# Patient Record
Sex: MALE | Race: WHITE | NOT HISPANIC OR LATINO | Employment: OTHER | URBAN - METROPOLITAN AREA
[De-identification: names, ages, dates, MRNs, and addresses within clinical notes are randomized per-mention and may not be internally consistent; named-entity substitution may affect disease eponyms.]

---

## 2017-04-26 ENCOUNTER — OFFICE VISIT (OUTPATIENT)
Dept: NEUROSURGERY | Facility: CLINIC | Age: 67
End: 2017-04-26

## 2017-04-26 VITALS
BODY MASS INDEX: 25.2 KG/M2 | DIASTOLIC BLOOD PRESSURE: 78 MMHG | HEART RATE: 64 BPM | WEIGHT: 180 LBS | HEIGHT: 71 IN | SYSTOLIC BLOOD PRESSURE: 133 MMHG

## 2017-04-26 DIAGNOSIS — G91.2 NORMAL PRESSURE HYDROCEPHALUS (HCC): Primary | ICD-10-CM

## 2017-04-26 DIAGNOSIS — Z45.41 ENCOUNTER FOR MANAGEMENT OF CEREBROSPINAL FLUID DRAINAGE DEVICE: ICD-10-CM

## 2017-04-26 PROCEDURE — 99212 OFFICE O/P EST SF 10 MIN: CPT | Performed by: NEUROLOGICAL SURGERY

## 2017-04-26 NOTE — PROGRESS NOTES
Subjective   Patient ID: Sandro De Anda is a 66 y.o. male is here today for follow-up after right frontal programmable [employer]  shunt on 8/25/15.    At the patient's last visit, his shunt was functional at the 1.5 mmH2O shunt setting.  He was encouraged to f/u in one year.    Today, the patient notes that sometimes he will experience a burning sensation in his right temple.  He also notes intermittent lightheadedness.      The patient denies any headaches, dizziness, seizures, fainting or black out spells.  The patient denies any issues with his gait.  He notes history of urinary incontinence, but states that he follows with urology for this.    History of Present Illness    The following portions of the patient's history were reviewed and updated as appropriate: allergies, current medications, past family history, past medical history, past social history, past surgical history and problem list.    I shunted this patient almost 2 years ago for normal pressure hydrocephalus.  He comes in for his annual visit.  The shunt is still set at 1.5.  It pumps and refills well.  His gait is actually quite normal to me today.  We'll see him again in one year.  He will let us know if there are any symptoms that are recurrent wart as she has had an MRI.      Review of Systems   Constitutional: Negative for fever.   Cardiovascular: Negative for chest pain.   Neurological: Negative for seizures.   All other systems reviewed and are negative.      Objective   Physical Exam   Constitutional: He is oriented to person, place, and time. He appears well-developed and well-nourished.   HENT:   Head: Normocephalic and atraumatic.   Eyes: Conjunctivae and EOM are normal. Pupils are equal, round, and reactive to light.   Fundoscopic exam:       The right eye shows no papilledema. The right eye shows venous pulsations.        The left eye shows no papilledema. The left eye shows venous pulsations.   Neck: Carotid bruit is not present.    Neurological: He is oriented to person, place, and time. He has a normal Finger-Nose-Finger Test and a normal Heel to Shin Test. Gait normal.   Reflex Scores:       Tricep reflexes are 2+ on the right side and 2+ on the left side.       Bicep reflexes are 2+ on the right side and 2+ on the left side.       Brachioradialis reflexes are 2+ on the right side and 2+ on the left side.       Patellar reflexes are 2+ on the right side and 2+ on the left side.       Achilles reflexes are 2+ on the right side and 2+ on the left side.  Psychiatric: His speech is normal.     Neurologic Exam     Mental Status   Oriented to person, place, and time.   Registration of memory: Good recent and remote memory.   Attention: normal. Concentration: normal.   Speech: speech is normal   Level of consciousness: alert  Knowledge: consistent with education.     Cranial Nerves     CN II   Visual fields full to confrontation.   Visual acuity: normal    CN III, IV, VI   Pupils are equal, round, and reactive to light.  Extraocular motions are normal.     CN V   Facial sensation intact.   Right corneal reflex: normal  Left corneal reflex: normal    CN VII   Facial expression full, symmetric.   Right facial weakness: none  Left facial weakness: none    CN VIII   Hearing: intact    CN IX, X   Palate: symmetric    CN XI   Right sternocleidomastoid strength: normal  Left sternocleidomastoid strength: normal    CN XII   Tongue: not atrophic  Tongue deviation: none    Motor Exam   Muscle bulk: normal  Right arm tone: normal  Left arm tone: normal  Right leg tone: normal  Left leg tone: normal    Strength   Strength 5/5 except as noted.     Sensory Exam   Light touch normal.     Gait, Coordination, and Reflexes     Gait  Gait: normal    Coordination   Finger to nose coordination: normal  Heel to shin coordination: normal    Reflexes   Right brachioradialis: 2+  Left brachioradialis: 2+  Right biceps: 2+  Left biceps: 2+  Right triceps: 2+  Left  triceps: 2+  Right patellar: 2+  Left patellar: 2+  Right achilles: 2+  Left achilles: 2+  Right : 2+  Left : 2+      Assessment/Plan   Independent Review of Radiographic Studies:      Medical Decision Making:    Doing well.  Almost 2 years out from surgery.  Shunt is set at 1.5.  It is functional.  We'll see him again in one year.      Sandro was seen today for normal pressure hydrocephalus.    Diagnoses and all orders for this visit:    Normal pressure hydrocephalus    Encounter for management of cerebrospinal fluid drainage device    Return in about 1 year (around 4/26/2018).

## 2018-04-24 NOTE — PROGRESS NOTES
Subjective   Patient ID: Sandro De Anda is a 67 y.o. male is here today for follow-up for a shunt check.    At the last visit the shunt was functional at the 1.5 setting. He reported a burning sensation in the right temple along with intermittent lightheadedness. He denied having any headaches, dizziness, seizures, fainting or black out spells and issues with his gait.    Today the patient reports that he is still having the burning sensation and lightheadedness. He also complains of dizziness. He denies having any headaches, seizures, fainting or black out spells and issues with his gait.    History of Present Illness    The following portions of the patient's history were reviewed and updated as appropriate: allergies, current medications, past family history, past medical history, past social history, past surgical history and problem list.    Review of Systems   Musculoskeletal: Negative for gait problem.   Neurological: Positive for dizziness and light-headedness. Negative for seizures.   All other systems reviewed and are negative.    He is with his wife. It has been almost 3 years since a  shunt was placed on the right for normal-pressure hydrocephalus. He is doing quite well. His gait is stable. The shunt pumps and refills well. It is still set at 1.5. He has some tenderness in the right temple which is not related to the shunt but might be early symptomatology related to temporal arteritis. I told him to talk to his primary care physician whom he will be seeing soon. Will see him in another year.      Objective   Physical Exam   Constitutional: He is oriented to person, place, and time. He appears well-developed and well-nourished.   HENT:   Head: Normocephalic and atraumatic.   Eyes: Conjunctivae and EOM are normal. Pupils are equal, round, and reactive to light.   Fundoscopic exam:       The right eye shows no papilledema. The right eye shows venous pulsations.        The left eye shows no papilledema. The  left eye shows venous pulsations.   Neck: Carotid bruit is not present.   Neurological: He is oriented to person, place, and time. He has a normal Finger-Nose-Finger Test and a normal Heel to Shin Test. Gait normal.   Reflex Scores:       Tricep reflexes are 2+ on the right side and 2+ on the left side.       Bicep reflexes are 2+ on the right side and 2+ on the left side.       Brachioradialis reflexes are 2+ on the right side and 2+ on the left side.       Patellar reflexes are 2+ on the right side and 2+ on the left side.       Achilles reflexes are 2+ on the right side and 2+ on the left side.  Psychiatric: His speech is normal.     Neurologic Exam     Mental Status   Oriented to person, place, and time.   Registration of memory: Good recent and remote memory.   Attention: normal. Concentration: normal.   Speech: speech is normal   Level of consciousness: alert  Knowledge: consistent with education.     Cranial Nerves     CN II   Visual fields full to confrontation.   Visual acuity: normal    CN III, IV, VI   Pupils are equal, round, and reactive to light.  Extraocular motions are normal.     CN V   Facial sensation intact.   Right corneal reflex: normal  Left corneal reflex: normal    CN VII   Facial expression full, symmetric.   Right facial weakness: none  Left facial weakness: none    CN VIII   Hearing: intact    CN IX, X   Palate: symmetric    CN XI   Right sternocleidomastoid strength: normal  Left sternocleidomastoid strength: normal    CN XII   Tongue: not atrophic  Tongue deviation: none    Motor Exam   Muscle bulk: normal  Right arm tone: normal  Left arm tone: normal  Right leg tone: normal  Left leg tone: normal    Strength   Strength 5/5 except as noted.     Sensory Exam   Light touch normal.     Gait, Coordination, and Reflexes     Gait  Gait: normal    Coordination   Finger to nose coordination: normal  Heel to shin coordination: normal    Reflexes   Right brachioradialis: 2+  Left  brachioradialis: 2+  Right biceps: 2+  Left biceps: 2+  Right triceps: 2+  Left triceps: 2+  Right patellar: 2+  Left patellar: 2+  Right achilles: 2+  Left achilles: 2+  Right : 2+  Left : 2+      Assessment/Plan   Independent Review of Radiographic Studies:      Medical Decision Making:    Almost 3 years out from a  shunt for NPH.  The shunt is functional and still set at 1.5.  He'll see me again in one year.      Diagnoses and all orders for this visit:    Normal pressure hydrocephalus    Encounter for management of cerebrospinal fluid drainage device      Return in about 1 year (around 4/25/2019).

## 2018-04-25 ENCOUNTER — OFFICE VISIT (OUTPATIENT)
Dept: NEUROSURGERY | Facility: CLINIC | Age: 68
End: 2018-04-25

## 2018-04-25 VITALS
DIASTOLIC BLOOD PRESSURE: 78 MMHG | SYSTOLIC BLOOD PRESSURE: 143 MMHG | HEART RATE: 61 BPM | BODY MASS INDEX: 26.04 KG/M2 | WEIGHT: 186 LBS | HEIGHT: 71 IN

## 2018-04-25 DIAGNOSIS — Z45.41 ENCOUNTER FOR MANAGEMENT OF CEREBROSPINAL FLUID DRAINAGE DEVICE: ICD-10-CM

## 2018-04-25 DIAGNOSIS — G91.2 NORMAL PRESSURE HYDROCEPHALUS (HCC): Primary | ICD-10-CM

## 2018-04-25 PROCEDURE — 99212 OFFICE O/P EST SF 10 MIN: CPT | Performed by: NEUROLOGICAL SURGERY

## 2018-04-25 RX ORDER — FINASTERIDE 5 MG/1
TABLET, FILM COATED ORAL
COMMUNITY
Start: 2018-04-10 | End: 2022-02-01

## 2019-01-25 ENCOUNTER — OFFICE VISIT CONVERTED (OUTPATIENT)
Dept: UROLOGY | Facility: CLINIC | Age: 69
End: 2019-01-25
Attending: UROLOGY

## 2019-02-11 ENCOUNTER — HOSPITAL ENCOUNTER (OUTPATIENT)
Dept: LAB | Facility: HOSPITAL | Age: 69
Discharge: HOME | End: 2019-02-11
Attending: UROLOGY

## 2019-02-11 LAB — PSA SERPL-MCNC: 0.19 NG/ML (ref 0–4)

## 2019-05-21 NOTE — PROGRESS NOTES
Subjective   Patient ID: Sandro De Anda is a 68 y.o. male is here today for yearly follow-up for NPH.     Patient was last seen 4.25.18 and his shunt was set at 1.5.  Patient was having dizziness at that time.    Patient is 4 years out from  shunt placement for NPH.    Patient states that he is still having constant dizziness, it is not severe.  Patient denies headaches, no double or blurry vision, he is due for an eye exam, he denies syncope or seizure.  He denies problems with his balance and gait.  No cognitive changes or concerns.    He see's Pee Tilley MD urologist in [facility] for urinary incontinence issues.  He has an upcoming appt to see him soon.    It has been about 4 years since I put in a  shunt for normal-pressure hydrocephalus. It was his gait that was bothering him the most, and his gait is strikingly normal now. He did have some urinary incontinence and some dementia and that is improved ever so slightly. He still has difficulty remembering. The shunt pumps and refills well. It is still set at 1.5. He has some prostate problems and he has seen the urologist for that. Will see him again in 1 year, sooner if there is a concern or a problem.      Dizziness   The problem occurs intermittently. The problem has been unchanged. Associated symptoms include urinary symptoms. Pertinent negatives include no headaches, numbness, vertigo, visual change or weakness.       The following portions of the patient's history were reviewed and updated as appropriate: allergies, current medications, past family history, past medical history, past social history, past surgical history and problem list.    Review of Systems   Eyes: Negative for visual disturbance.   Genitourinary: Positive for difficulty urinating.   Musculoskeletal: Negative for gait problem.   Neurological: Positive for dizziness. Negative for vertigo, tremors, seizures, syncope, facial asymmetry, speech difficulty, weakness, light-headedness,  numbness and headaches.   All other systems reviewed and are negative.      Objective   Physical Exam   Constitutional: He is oriented to person, place, and time. He appears well-developed and well-nourished.   HENT:   Head: Normocephalic and atraumatic.   Eyes: Conjunctivae and EOM are normal. Pupils are equal, round, and reactive to light.   Fundoscopic exam:       The right eye shows no papilledema. The right eye shows venous pulsations.        The left eye shows no papilledema. The left eye shows venous pulsations.   Neck: Carotid bruit is not present.   Neurological: He is oriented to person, place, and time. He has a normal Finger-Nose-Finger Test and a normal Heel to Shin Test. Gait normal.   Reflex Scores:       Tricep reflexes are 2+ on the right side and 2+ on the left side.       Bicep reflexes are 2+ on the right side and 2+ on the left side.       Brachioradialis reflexes are 2+ on the right side and 2+ on the left side.       Patellar reflexes are 2+ on the right side and 2+ on the left side.       Achilles reflexes are 2+ on the right side and 2+ on the left side.  Psychiatric: His speech is normal.     Neurologic Exam     Mental Status   Oriented to person, place, and time.   Registration of memory: Good recent and remote memory.   Attention: normal. Concentration: normal.   Speech: speech is normal   Level of consciousness: alert  Knowledge: consistent with education.     Cranial Nerves     CN II   Visual fields full to confrontation.   Visual acuity: normal    CN III, IV, VI   Pupils are equal, round, and reactive to light.  Extraocular motions are normal.     CN V   Facial sensation intact.   Right corneal reflex: normal  Left corneal reflex: normal    CN VII   Facial expression full, symmetric.   Right facial weakness: none  Left facial weakness: none    CN VIII   Hearing: intact    CN IX, X   Palate: symmetric    CN XI   Right sternocleidomastoid strength: normal  Left sternocleidomastoid  strength: normal    CN XII   Tongue: not atrophic  Tongue deviation: none    Motor Exam   Muscle bulk: normal  Right arm tone: normal  Left arm tone: normal  Right leg tone: normal  Left leg tone: normal    Strength   Strength 5/5 except as noted.     Sensory Exam   Light touch normal.     Gait, Coordination, and Reflexes     Gait  Gait: normal    Coordination   Finger to nose coordination: normal  Heel to shin coordination: normal    Reflexes   Right brachioradialis: 2+  Left brachioradialis: 2+  Right biceps: 2+  Left biceps: 2+  Right triceps: 2+  Left triceps: 2+  Right patellar: 2+  Left patellar: 2+  Right achilles: 2+  Left achilles: 2+  Right : 2+  Left : 2+      Assessment/Plan   Independent Review of Radiographic Studies:    I reviewed the postoperative CT scan done on 8/26/2015 which shows good position of the shunt within the enlarged ventricles.  Agree with the report.      Medical Decision Making:    The shunt is functional.  It is set at 1.5.  He will see me again in 1 year.      Sandro was seen today for dizziness.    Diagnoses and all orders for this visit:    Normal pressure hydrocephalus    Encounter for management of cerebrospinal fluid drainage device      Return in about 1 year (around 5/31/2020).

## 2019-05-31 ENCOUNTER — OFFICE VISIT (OUTPATIENT)
Dept: NEUROSURGERY | Facility: CLINIC | Age: 69
End: 2019-05-31

## 2019-05-31 VITALS
WEIGHT: 190 LBS | DIASTOLIC BLOOD PRESSURE: 78 MMHG | BODY MASS INDEX: 26.6 KG/M2 | SYSTOLIC BLOOD PRESSURE: 140 MMHG | HEIGHT: 71 IN | HEART RATE: 72 BPM

## 2019-05-31 DIAGNOSIS — Z45.41 ENCOUNTER FOR MANAGEMENT OF CEREBROSPINAL FLUID DRAINAGE DEVICE: ICD-10-CM

## 2019-05-31 DIAGNOSIS — G91.2 NORMAL PRESSURE HYDROCEPHALUS (HCC): Primary | ICD-10-CM

## 2019-05-31 PROCEDURE — 99212 OFFICE O/P EST SF 10 MIN: CPT | Performed by: NEUROLOGICAL SURGERY

## 2020-02-21 ENCOUNTER — HOSPITAL ENCOUNTER (OUTPATIENT)
Dept: LAB | Facility: HOSPITAL | Age: 70
Discharge: HOME | End: 2020-02-21
Attending: UROLOGY

## 2020-02-21 ENCOUNTER — OFFICE VISIT CONVERTED (OUTPATIENT)
Dept: UROLOGY | Facility: CLINIC | Age: 70
End: 2020-02-21
Attending: UROLOGY

## 2020-02-21 LAB — PSA SERPL-MCNC: 0.13 NG/ML (ref 0–4)

## 2020-03-06 ENCOUNTER — HOSPITAL ENCOUNTER (OUTPATIENT)
Dept: SURGERY | Facility: CLINIC | Age: 70
Discharge: HOME | End: 2020-03-06
Attending: UROLOGY

## 2020-03-06 ENCOUNTER — PROCEDURE VISIT CONVERTED (OUTPATIENT)
Dept: UROLOGY | Facility: CLINIC | Age: 70
End: 2020-03-06
Attending: UROLOGY

## 2020-03-08 LAB — BACTERIA UR CULT: NORMAL

## 2020-05-14 NOTE — PROGRESS NOTES
Subjective   History of Present Illness: Sandro De Anda is a 69 y.o. male for televisit follow up.  He has a  shunt that was set at 1.5 at his last visit    He was last seen 5.31.19 for mild intermittent dizziness    He states that he is doing well, he denies headaches, dizziness, problems with his balance and gait, he denies double or blurry vision, he states that he has not had an eye exam in 5 years, no cognitive changes or concerns.  No urinary incontinence.  He states that his shunt appears to be working well      You have chosen to receive care through a telephone visit. Do you consent to use a telephone visit for your medical care today? Yes, he is at home    This was a televisit from my office. The patient was at home. It lasted for 7 minutes. It has been 5 years since I placed a shunt for normal pressure hydrocephalus, presenting mainly with gait ataxia. His gait stabilized. He did not really note a dramatic improvement but he certainly has not gotten worse. It has been a year since I have seen him. He reports no changes and no falling. He still has bladder problems, but probably related to his prostate and he works with his urologist for that. I told him it is still important for me to see him face to face and will arrange another visit in 1 year. His shunt at least check was at 1.5.      History of Present Illness    The following portions of the patient's history were reviewed and updated as appropriate: allergies, current medications, past family history, past medical history, past social history, past surgical history and problem list.    Review of Systems   Constitutional: Negative.    Eyes: Negative.  Negative for visual disturbance.   Respiratory: Negative.    Cardiovascular: Negative.    Gastrointestinal: Negative.    Endocrine: Negative.    Genitourinary: Negative for urgency.   Musculoskeletal: Negative.    Allergic/Immunologic: Negative.    Neurological: Negative for dizziness, tremors, seizures,  syncope, facial asymmetry, speech difficulty, weakness, light-headedness and headaches.   Hematological: Negative.    Psychiatric/Behavioral: Negative for confusion and decreased concentration.       Objective             Physical Exam   Deferred  Neurologic Exam   Deferred        Assessment/Plan   Independent Review of Radiographic Studies:      I personally reviewed the images from the following studies.    I reviewed the postoperative CT scan done on 8/26/2015 which shows good position of the shunt within the enlarged ventricles.  Agree with the report.    Medical Decision Making:      Overall, appears to be doing well.  His shunt is still set at 1.5.  He will see me as a face-to-face visit in 1 year.      Sandro was seen today for follow-up.    Diagnoses and all orders for this visit:    Normal pressure hydrocephalus (CMS/[facility])    Encounter for management of cerebrospinal fluid drainage device      Return in about 1 year (around 5/19/2021).

## 2020-05-18 ENCOUNTER — PROCEDURE VISIT CONVERTED (OUTPATIENT)
Dept: UROLOGY | Facility: CLINIC | Age: 70
End: 2020-05-18
Attending: UROLOGY

## 2020-05-19 ENCOUNTER — OFFICE VISIT (OUTPATIENT)
Dept: NEUROSURGERY | Facility: CLINIC | Age: 70
End: 2020-05-19

## 2020-05-19 DIAGNOSIS — G91.2 NORMAL PRESSURE HYDROCEPHALUS (HCC): Primary | ICD-10-CM

## 2020-05-19 DIAGNOSIS — Z45.41 ENCOUNTER FOR MANAGEMENT OF CEREBROSPINAL FLUID DRAINAGE DEVICE: ICD-10-CM

## 2020-05-19 PROCEDURE — 99441 PR PHYS/QHP TELEPHONE EVALUATION 5-10 MIN: CPT | Performed by: NEUROLOGICAL SURGERY

## 2020-06-19 ENCOUNTER — TELEPHONE CONVERTED (OUTPATIENT)
Dept: UROLOGY | Facility: CLINIC | Age: 70
End: 2020-06-19
Attending: UROLOGY

## 2020-06-29 ENCOUNTER — HOSPITAL ENCOUNTER (OUTPATIENT)
Dept: SURGERY | Facility: CLINIC | Age: 70
Discharge: HOME | End: 2020-06-29
Attending: UROLOGY

## 2020-07-01 LAB — BACTERIA UR CULT: NORMAL

## 2020-09-18 ENCOUNTER — OFFICE VISIT CONVERTED (OUTPATIENT)
Dept: UROLOGY | Facility: CLINIC | Age: 70
End: 2020-09-18
Attending: UROLOGY

## 2020-12-15 ENCOUNTER — OFFICE VISIT CONVERTED (OUTPATIENT)
Dept: UROLOGY | Facility: CLINIC | Age: 70
End: 2020-12-15
Attending: UROLOGY

## 2021-05-10 NOTE — PROCEDURES
Procedure Note      Patient Name: Sandro De Anda   Patient ID: [id number]   Sex: Male   YOB: 1950    Primary Care Provider: Patsy MAS   Referring Provider: Pee Tilley MD    Visit Date: May 18, 2020    Provider: Pee Tilley MD   Location[facility]   Location Address: [address] [place] [address]  [id number]   Location Phone: [phone]          Sandro De Anda presents to the office today to undergo a Rezum procedure. The patient understands the material risks, possible benefits, and alternatives for symptomatic Benign Prostate Hypertrophy. The patient understands that BLADDER FUNCTION is independent of this de-obstructing procedure. Improved voiding results may take 2 to 4 weeks before being noticed depending on prostate size and number of treatments. Bleeding, infection and irritative voiding are the most common side effects. Patients will continue to use their current BPH meds until advised to taper or stop. The patient was given the opportunity to have their questions answered in a previous face to face meeting and also today. Informed consent signed by a patient.   Patient medications and allergies have been reviewed prior to treatment. Patients on blood thinners or aspirin (ASA) have been advised to check with the prescribing MD if it is okay to hold these prior to treatment. Risks of withholding blood thinners and or ASA have been discussed.   Prior to proceeding, transrectal ultrasound obtained prostate volume and dimensions.   Prostate: 25 g HT: 2.5 mm W: 4.1 mm L: 4.5 mm   Tammy-prostatic block anesthesia was then performed using trans-rectal ultrasound guidance and a long 18-gauge spinal needle. INPUT cc's of 1% lidocaine was injected bilaterally. Minimal blood loss.   Description of Procedure:   Transurethral destruction of prostate tissue; by radiofrequency thermotherapy. In the lithotomy position, under local anesthesia, the patient was prepped  and draped in the usual manner. The anterior urethra, prostate, bladder neck, right and left orifices were visualized as the scope was passed. RF was then used to create thermal energy to selectively ablate prostate tissue 1 cm from the bladder neck to the proximal end of the veru spaced 1 cm apart.   2 Treatments were given. Treatments: Rt. Side 0-4 1, Lt. Side 0-4 1. Median lobe 0-3 0. Blood loss was minimal. The patient was monitored throughout the procedure.   After the completion of the procedure the treatment device was removed. There was a careful check that there were no clots in the bladder or injury to the bladder, prostate, or urethra. Flanagan catheter was then placed.           Assessment  · Benign prostatic hyperplasia with lower urinary tract symptoms     600.01/N40.1      Plan  · Orders  o Transurethral destruction of prostate tissue; by radiofrequency-generated water vapor thermotherapy (23358) - 600.01/N40.1 - 05/18/2020  · Medications  o Medications have been Reconciled  o Transition of Care or Provider Policy  · Instructions  o Urine was negative for nitrite, WBC, and RBC's.   o The patient was observed, appeared stable and in good condition at the time of discharge. Post-procedure expectations and activity limitations were reviewed with him and written instructions were provided. The patient is going home with a Flanagan catheter, therefore catheter care and PRN irrigation instructions were reviewed and written post-op instructions were provided. He is scheduled to return to the office for its removal. He was also instructed to continue mild oral analgesia PRN. The patient was then transported by our attending staff member to his car by wheelchair. His care was given unto a consenting adult who is responsible for his transportation home.            Electronically Signed by: Pee Tilley MD -Author on May 18, 2020 01:46:16 PM

## 2021-05-13 NOTE — PROGRESS NOTES
Progress Note      Patient Name: Sandro De Anda   Patient ID: [id number]   Sex: Male   YOB: 1950    Primary Care Provider: Patsy MAS   Referring Provider: Pee Tilley MD    Visit Date: September 18, 2020    Provider: Pee Tilley MD   Location: [facility]   Location Address: [address] [place] [address]  [id number]   Location Phone: [phone]          Chief Complaint  · pt here for urologic issues      History Of Present Illness     69-year-old  gentleman who is coming in today for BPH with lower urinary tract symptoms. s/p Rezum    Had a lot of trouble after Rezum he was having a lot of urgency/frequency.  Some minimal incontinence  No gross hematuria, he does have some dysuria. He does think things are a little better in the last 2 weeks.  Weak stream some of the time.    Currently on Flomax 0.4 mg daily. Not sure if this is helping.    5/18/2020  Rezum     was on Flomax and finasteride in the past.      PVR    6/20   75  5/18/2020  Rezum  1/19  80    2-3 caffeinated beverages daily.    Previous    3/20 cystoscopy2 cm prostate, large bladder with some very shallow diverticulum's and some mild trabeculations.    No history of kidney stone.    Brother with prostate cancer    No cardiopulmonary history.  Patient does not smoke.  Baby ASA     Getting prostate cancer screening through PCP.    PSA    2/19  0.19       Past Medical History  Hemorrhoids; Sciatic pain; Seasonal allergies; Urinary Retention         Past Surgical History  Shunt placement; Vasectomy         Medication List  aspirin 81 mg oral tablet,delayed release (DR/EC); Daily Multi-Vitamin oral tablet; Dulcolax (bisacodyl) 5 mg oral tablet,delayed release (DR/EC); finasteride 5 mg oral tablet; ibuprofen 200 mg oral tablet; tamsulosin 0.4 mg oral capsule         Allergy List  NO KNOWN DRUG ALLERGIES         Family Medical History  Stroke         Social History  Alcohol; ;  "Tobacco (Former)         Immunizations  Name Date Admin   Influenza    Influenza    Influenza          Review of Systems  · Constitutional  o Denies  o : chills, fever  · Gastrointestinal  o Denies  o : nausea, vomiting      Vitals  Date Time BP Position Site L\R Cuff Size HR RR TEMP (F) WT  HT  BMI kg/m2 BSA m2 O2 Sat        09/18/2020 11:37 AM       17  187lbs 0oz 5'  11\" 26.08 2.06           Physical Examination  · Constitutional  o Appearance  o : Well-appearing, well-developed, in no acute distress  · Respiratory  o Respiratory Effort  o : Unlabored breathing  · Gastrointestinal  o Abdominal Examination  o : Nontender, nondistended, no rigidity or guarding, no hepatosplenomegaly  · Neurologic  o Mental Status Examination  o :   § Orientation  § : Grossly oriented to person, place and time, judgment and insight intact, normal mood and affect          Results  · In-Office Procedures  o Surgical procedure  § IOP - Bladder Scan/Residual Urine (36254)   § Specimen vol Ur: 075   o Lab procedure  § Automated Dipstick Urinalysis (Surg Spec) WITHOUT Micro HMH (36997)   § Color Ur: Yellow   § Clarity Ur: Clear   § Glucose Ur Ql Strip: Negative   § Bilirub Ur Ql Strip: Negative   § Ketones Ur Ql Strip: Negative   § Sp Gr Ur Qn: 1.010   § Hgb Ur Ql Strip: Negative   § pH Ur-LsCnc: 7.0   § Prot Ur Ql Strip: Negative   § Urobilinogen Ur Strip-mCnc: 0.2 E.U./dL   § Nitrite Ur Ql Strip: Negative   § WBC Est Ur Ql Strip: Negative       Assessment  · Benign prostatic hyperplasia with weak urinary stream       Benign prostatic hyperplasia with lower urinary tract symptoms     600.01/N40.1  Poor urinary stream     600.01/R39.12  · Erectile dysfunction, unspecified erectile dysfunction type     607.84/N52.9  · Prostate cancer screening     V76.44/Z12.5    Problems Reconciled  Plan  · Medications  o Medications have been Reconciled  o Transition of Care or Provider Policy  · Instructions  o Electronically Identified Patient " Education Materials Provided Electronically       Patient is not doing much better.  He did think maybe things improved in the last 2 weeks.  At this time we will stay on Flomax 0.4 mg daily.  I did discuss if he gets better to stop this medication.    At this time we will see how things go, if he is not better in a few months we can discuss a new plan either back to medical management or possible TURP    Follow-up in 3 months with [facility]    in person visit.             Electronically Signed by: Pee Tilley MD -Author on September 18, 2020 03:52:13 PM

## 2021-05-14 VITALS — HEIGHT: 71 IN | RESPIRATION RATE: 17 BRPM | WEIGHT: 192.37 LBS | BODY MASS INDEX: 26.93 KG/M2

## 2021-05-14 VITALS — WEIGHT: 187 LBS | HEIGHT: 71 IN | BODY MASS INDEX: 26.18 KG/M2 | RESPIRATION RATE: 17 BRPM

## 2021-05-14 NOTE — PROGRESS NOTES
"   Progress Note      Patient Name: Sandro De Anda   Patient ID: [id number]   Sex: Male   YOB: 1950    Primary Care Provider: Patsy MAS   Referring Provider: Pee Tilley MD    Visit Date: December 15, 2020    Provider: Pee Tilley MD   Location: [facility]   Location Address: [address] [place] [address]  [id number]   Location Phone: [phone]          Chief Complaint  · pt here for urologic issues      History Of Present Illness     70-year-old  gentleman who is coming in today for BPH with lower urinary tract symptoms. s/p Rezum 5/20    Stream is good most of time.  Patient of burning/dysuria.  Patient stopped Flomax a month ago.  No change after stopping this medication    PVR    12/20 21  6/20   75  5/18/2020  Rezum  1/19  80    Previous    3/20 cystoscopy - 2 cm prostate, large bladder with some very shallow diverticulum's and some mild trabeculations.    No history of kidney stone.    Brother with prostate cancer    No cardiopulmonary history.  Patient does not smoke.  Baby ASA     Getting prostate cancer screening through PCP.    PSA    2/19  0.19       Past Medical History  Hemorrhoids; Sciatic pain; Seasonal allergies; Urinary retention         Past Surgical History  Shunt placement; Vasectomy         Allergy List  NO KNOWN DRUG ALLERGIES         Family Medical History  Stroke         Social History  Alcohol; ; Tobacco (Former)         Immunizations  Name Date Admin   Influenza 11/13/2019   Influenza 11/13/2019   Influenza 11/08/2018         Review of Systems  · Constitutional  o Denies  o : chills, fever  · Gastrointestinal  o Denies  o : nausea, vomiting      Vitals  Date Time BP Position Site L\R Cuff Size HR RR TEMP (F) WT  HT  BMI kg/m2 BSA m2 O2 Sat FR L/min FiO2        12/15/2020 12:53 PM       17  192lbs 6oz 5'  11\" 26.83 2.09             Physical Examination  · Constitutional  o Appearance  o : Well-appearing, " well-developed, in no acute distress  · Respiratory  o Respiratory Effort  o : Unlabored breathing  · Cardiovascular  o Heart  o :   § Auscultation of Heart  § : Regular rate and rhythm, no murmurs  · Gastrointestinal  o Abdominal Examination  o : Nontender, nondistended, no rigidity or guarding, no hepatosplenomegaly  · Neurologic  o Mental Status Examination  o :   § Orientation  § : Grossly oriented to person, place and time, judgment and insight intact, normal mood and affect          Results  · In-Office Procedures  o Lab procedure  § Automated Dipstick Urinalysis (Surg Spec) WITHOUT Micro HMH (01047)   § Color Ur: Yellow   § Clarity Ur: Clear   § Glucose Ur Ql Strip: Negative   § Bilirub Ur Ql Strip: Negative   § Ketones Ur Ql Strip: Negative   § Sp Gr Ur Qn: 1.025   § Hgb Ur Ql Strip: Negative   § pH Ur-LsCnc: 7.0   § Prot Ur Ql Strip: Negative   § Urobilinogen Ur Strip-mCnc: 1.0 E.U./dL   § Nitrite Ur Ql Strip: Negative   § WBC Est Ur Ql Strip: Negative   o Surgical procedure  § IOP - Bladder Scan/Residual Urine (24527)   § Specimen vol Ur: 021       Assessment  · Benign prostatic hyperplasia with weak urinary stream       Benign prostatic hyperplasia with lower urinary tract symptoms     600.01/N40.1  Poor urinary stream     600.01/R39.12  · Erectile dysfunction, unspecified erectile dysfunction type     607.84/N52.9  · Prostate cancer screening     V76.44/Z12.5      Plan  · Medications  o Medications have been Reconciled  o Transition of Care or Provider Policy  · Instructions  o Electronically Identified Patient Education Materials Provided Electronically       BPH    Better, no prostate meds needed at this time      If doing ok next year could possibly be discharged from clinic.    Follow-up in 1 year with nurse practitioner with [facility]                 Electronically Signed by: Pee Tilley MD -Author on December 15, 2020 01:36:22 PM

## 2021-05-15 VITALS — RESPIRATION RATE: 16 BRPM | WEIGHT: 190 LBS | BODY MASS INDEX: 26.6 KG/M2 | HEIGHT: 71 IN

## 2021-05-15 VITALS — HEIGHT: 71 IN | RESPIRATION RATE: 12 BRPM | BODY MASS INDEX: 27.21 KG/M2 | WEIGHT: 194.37 LBS

## 2021-05-18 NOTE — PROGRESS NOTES
"Subjective   Patient ID: Sandro De Anda is a 70 y.o. male is here today for follow-up. Patient was last seen   5/19/20 for Normal pressure hydrocephalus.      Patient reports today for a shunt check .  He complains of fatigue.    I shunted this patient for normal pressure hydrocephalus 6 years ago.  He is here for his yearly check.  His walking is about the same.  Is been stabilized since the shunt.  Pumps and refills well.  Still set at 1.5.  It is functional.  I will see him in 1 year.    History of Present Illness    The following portions of the patient's history were reviewed and updated as appropriate: allergies, current medications, past family history, past medical history, past social history, past surgical history and problem list.    Review of Systems   Constitutional: Negative for fever.   All other systems reviewed and are negative.          Objective     Vitals:    05/19/21 1452   BP: 126/86   Temp: 98.6 °F (37 °C)   Weight: 86.5 kg (190 lb 9.6 oz)   Height: 180.3 cm (71\")     Body mass index is 26.58 kg/m².      Physical Exam  Constitutional:       Appearance: He is well-developed.   HENT:      Head: Normocephalic and atraumatic.   Eyes:      Extraocular Movements: EOM normal.      Conjunctiva/sclera: Conjunctivae normal.      Pupils: Pupils are equal, round, and reactive to light.   Neck:      Vascular: No carotid bruit.   Neurological:      Mental Status: He is oriented to person, place, and time.      Coordination: Finger-Nose-Finger Test and Heel to Shin Test normal.      Gait: Gait is intact.      Deep Tendon Reflexes:      Reflex Scores:       Tricep reflexes are 2+ on the right side and 2+ on the left side.       Bicep reflexes are 2+ on the right side and 2+ on the left side.       Brachioradialis reflexes are 2+ on the right side and 2+ on the left side.       Patellar reflexes are 2+ on the right side and 2+ on the left side.       Achilles reflexes are 2+ on the right side and 2+ on the left " side.  Psychiatric:         Speech: Speech normal.       Neurologic Exam     Mental Status   Oriented to person, place, and time.   Registration of memory: Good recent and remote memory.   Attention: normal. Concentration: normal.   Speech: speech is normal   Level of consciousness: alert  Knowledge: consistent with education.     Cranial Nerves     CN II   Visual fields full to confrontation.   Visual acuity: normal    CN III, IV, VI   Pupils are equal, round, and reactive to light.  Extraocular motions are normal.     CN V   Facial sensation intact.   Right corneal reflex: normal  Left corneal reflex: normal    CN VII   Facial expression full, symmetric.   Right facial weakness: none  Left facial weakness: none    CN VIII   Hearing: intact    CN IX, X   Palate: symmetric    CN XI   Right sternocleidomastoid strength: normal  Left sternocleidomastoid strength: normal    CN XII   Tongue: not atrophic  Tongue deviation: none    Motor Exam   Muscle bulk: normal  Right arm tone: normal  Left arm tone: normal  Right leg tone: normal  Left leg tone: normal    Strength   Strength 5/5 except as noted.     Sensory Exam   Light touch normal.     Gait, Coordination, and Reflexes     Gait  Gait: normal    Coordination   Finger to nose coordination: normal  Heel to shin coordination: normal    Reflexes   Right brachioradialis: 2+  Left brachioradialis: 2+  Right biceps: 2+  Left biceps: 2+  Right triceps: 2+  Left triceps: 2+  Right patellar: 2+  Left patellar: 2+  Right achilles: 2+  Left achilles: 2+  Right : 2+  Left : 2+          Assessment/Plan   Independent Review of Radiographic Studies:      I personally reviewed the images from the following studies.    I reviewed the postoperative CT scan done on 8/26/2015 which shows good position of the shunt within the enlarged ventricles.  Agree with the report    Medical Decision Making:      Clinically stable.  Shunt is functional and set at 1.5.  I will see him in 1  year.      Diagnoses and all orders for this visit:    1. Normal pressure hydrocephalus (CMS/HCC) (Primary)    2. Encounter for management of cerebrospinal fluid drainage device      Return in about 1 year (around 5/19/2022) for Face-to-face.

## 2021-05-19 ENCOUNTER — OFFICE VISIT (OUTPATIENT)
Dept: NEUROSURGERY | Facility: CLINIC | Age: 71
End: 2021-05-19

## 2021-05-19 VITALS
HEIGHT: 71 IN | WEIGHT: 190.6 LBS | TEMPERATURE: 98.6 F | BODY MASS INDEX: 26.68 KG/M2 | DIASTOLIC BLOOD PRESSURE: 86 MMHG | SYSTOLIC BLOOD PRESSURE: 126 MMHG

## 2021-05-19 DIAGNOSIS — Z45.41 ENCOUNTER FOR MANAGEMENT OF CEREBROSPINAL FLUID DRAINAGE DEVICE: ICD-10-CM

## 2021-05-19 DIAGNOSIS — G91.2 NORMAL PRESSURE HYDROCEPHALUS (HCC): Primary | ICD-10-CM

## 2021-05-19 PROCEDURE — 99213 OFFICE O/P EST LOW 20 MIN: CPT | Performed by: NEUROLOGICAL SURGERY

## 2022-01-26 ENCOUNTER — TELEPHONE (OUTPATIENT)
Dept: NEUROSURGERY | Facility: CLINIC | Age: 72
End: 2022-01-26

## 2022-02-01 ENCOUNTER — OFFICE VISIT (OUTPATIENT)
Dept: UROLOGY | Facility: CLINIC | Age: 72
End: 2022-02-01

## 2022-02-01 VITALS — WEIGHT: 197.2 LBS | RESPIRATION RATE: 16 BRPM | BODY MASS INDEX: 27.61 KG/M2 | HEIGHT: 71 IN

## 2022-02-01 DIAGNOSIS — N40.0 BENIGN PROSTATIC HYPERPLASIA, UNSPECIFIED WHETHER LOWER URINARY TRACT SYMPTOMS PRESENT: Primary | ICD-10-CM

## 2022-02-01 PROBLEM — K64.9 HEMORRHOIDS: Status: ACTIVE | Noted: 2022-02-01

## 2022-02-01 PROBLEM — J30.2 SEASONAL ALLERGIC RHINITIS: Status: ACTIVE | Noted: 2022-02-01

## 2022-02-01 PROBLEM — R33.9 URINARY RETENTION: Status: ACTIVE | Noted: 2022-02-01

## 2022-02-01 PROBLEM — M50.30 DDD (DEGENERATIVE DISC DISEASE), CERVICAL: Status: ACTIVE | Noted: 2022-02-01

## 2022-02-01 PROBLEM — R26.0 ATAXIC GAIT: Status: ACTIVE | Noted: 2022-02-01

## 2022-02-01 PROBLEM — M54.30 SCIATIC PAIN: Status: ACTIVE | Noted: 2022-02-01

## 2022-02-01 PROBLEM — F03.C0 ADVANCED DEMENTIA (HCC): Status: ACTIVE | Noted: 2022-02-01

## 2022-02-01 LAB
BILIRUB BLD-MCNC: NEGATIVE MG/DL
CLARITY, POC: CLEAR
COLOR UR: YELLOW
EXPIRATION DATE: NORMAL
GLUCOSE UR STRIP-MCNC: NEGATIVE MG/DL
KETONES UR QL: NEGATIVE
LEUKOCYTE EST, POC: NEGATIVE
Lab: NORMAL
NITRITE UR-MCNC: NEGATIVE MG/ML
PH UR: 5.5 [PH] (ref 5–8)
PROT UR STRIP-MCNC: NEGATIVE MG/DL
RBC # UR STRIP: NEGATIVE /UL
SP GR UR: 1.01 (ref 1–1.03)
URINE VOLUME: 41
UROBILINOGEN UR QL: NORMAL

## 2022-02-01 PROCEDURE — 81003 URINALYSIS AUTO W/O SCOPE: CPT | Performed by: NURSE PRACTITIONER

## 2022-02-01 PROCEDURE — 51798 US URINE CAPACITY MEASURE: CPT | Performed by: NURSE PRACTITIONER

## 2022-02-01 PROCEDURE — 99212 OFFICE O/P EST SF 10 MIN: CPT | Performed by: NURSE PRACTITIONER

## 2022-02-01 RX ORDER — MULTIPLE VITAMINS W/ MINERALS TAB 9MG-400MCG
TAB ORAL
COMMUNITY
End: 2022-05-25

## 2022-02-01 RX ORDER — IBUPROFEN 200 MG
TABLET ORAL
COMMUNITY

## 2022-02-01 NOTE — PROGRESS NOTES
"Chief Complaint: Benign Prostatic Hypertrophy (NO ISSUES)    Subjective         History of Present Illness  Sandro De Anda is a 71 y.o. male presents to [facility] to be seen for annual follow-up BPH.    Patient is status post Rezum 5/20.    Good stream.    No medications.    Nocturia x 1     Good stream during the day but nighttime void and am not good.    Some post void dribble at times.     No GH         Objective     Past Medical History:   Diagnosis Date   • DDD (degenerative disc disease), cervical    • Dementia (HCC)    • NPH (normal pressure hydrocephalus) (HCC)        Past Surgical History:   Procedure Laterality Date   • NO PAST SURGERIES           Current Outpatient Medications:   •  aspirin 81 MG tablet, Take  by mouth., Disp: , Rfl:   •  ibuprofen (ADVIL,MOTRIN) 200 MG tablet, ibuprofen 200 mg oral tablet take 1 tablet (200 mg) by oral route every 6 hours as needed with food   Suspended, Disp: , Rfl:   •  Multiple Vitamin (MULTIVITAMINS PO), Take  by mouth., Disp: , Rfl:   •  multivitamin with minerals (DAILY MULTI VITAMIN/MINERALS PO), Daily Multi-Vitamin oral tablet take 1 tablet by oral route daily   Suspended, Disp: , Rfl:     No Known Allergies     Family History   Problem Relation Age of Onset   • Stroke Other    • Heart failure Other    • Hypertension Other    • Aneurysm Other        Social History     Socioeconomic History   • Marital status:    • Years of education: high school graduate   Tobacco Use   • Smoking status: Never Smoker   • Smokeless tobacco: Never Used   Vaping Use   • Vaping Use: Never used   Substance and Sexual Activity   • Alcohol use: No   • Drug use: No   • Sexual activity: Defer       Vital Signs:   Resp 16   Ht 180.3 cm (71\")   Wt 89.4 kg (197 lb 3.2 oz)   BMI 27.50 kg/m²      Physical Exam     Result Review :   The following data was reviewed by: CHACE Martin on 02/01/2022:  Results for orders placed or performed in visit on " 02/01/22   Bladder Scan   Result Value Ref Range    Urine Volume 41    POC Urinalysis Dipstick, Automated    Specimen: Urine   Result Value Ref Range    Color Yellow Yellow, Straw, Dark Yellow, Sahra    Clarity, UA Clear Clear    Specific Gravity  1.010 1.005 - 1.030    pH, Urine 5.5 5.0 - 8.0    Leukocytes Negative Negative    Nitrite, UA Negative Negative    Protein, POC Negative Negative mg/dL    Glucose, UA Negative Negative, 1000 mg/dL (3+) mg/dL    Ketones, UA Negative Negative    Urobilinogen, UA Normal Normal    Bilirubin Negative Negative    Blood, UA Negative Negative    Lot Number [id number]     Expiration Date 2,022/12         Bladder Scan interpretation 02/01/2022    Estimation of residual urine via [employer] 3000 [address] Bladder Scan  Residual Urine: 41 ml  Indication: Benign prostatic hyperplasia, unspecified whether lower urinary tract symptoms present   Position: Supine  Examination: Incremental scanning of the suprapubic area using 2.0 MHz transducer using copious amounts of acoustic gel.   Findings: An anechoic area was demonstrated which represented the bladder, with measurement of residual urine as noted. I inspected this myself. In that the residual urine was insignificant, refer to plan for treatment and plan necessary at this time.         Procedures        Assessment and Plan    Diagnoses and all orders for this visit:    1. Benign prostatic hyperplasia, unspecified whether lower urinary tract symptoms present (Primary)  -     Bladder Scan  -     POC Urinalysis Dipstick, Automated    Patient doing well at this time. We will f/u annually and PRN.       I spent 10 minutes caring for Sandro on this date of service. This time includes time spent by me in the following activities:reviewing tests, obtaining and/or reviewing a separately obtained history, performing a medically appropriate examination and/or evaluation , counseling and educating the patient/family/caregiver, ordering medications, tests, or  procedures, and documenting information in the medical record  Follow Up   No follow-ups on file.  Patient was given instructions and counseling regarding his condition or for health maintenance advice. Please see specific information pulled into the AVS if appropriate.         This document has been electronically signed by CHACE Martin  February 1, 2022 10:22 EST

## 2022-05-13 NOTE — PROGRESS NOTES
Subjective   Patient ID: Sandro De Anda is a 71 y.o. male is here today for follow-up of NPH.  Pt last seen on 5/19/21 and reported no issues. Shunt set at 1.5 at last visit.    Today, Mr. De Anda reports dizziness that occurs every day. He denies any recent falls but has stumbled. He denies headaches and visual problems.     This patient is with his wife.  Its been 7 years since I put in a right frontal programmable [employer] strata  shunt set at 1.5 for normal pressure hydrocephalus.  It did seem to help his gait slightly.  He is always had some residual balance symptoms.  He and his wife say that he feels he has gotten worse over the last year.  The shunt pumps and refills well.  It is still set at 1.5.  We have never tried a shunt change setting it is reasonable to do so now and so I changed it to 1.0.  I told him any change would take some time to have an effect if it is going to have any so I urged him to be patient.  We will see him in 3 months with a repeat CT scan to make sure he does not develop subdural hygromas or hematomas.        History of Present Illness    The following portions of the patient's history were reviewed and updated as appropriate: allergies, current medications, past family history, past medical history, past social history, past surgical history and problem list.    Review of Systems   Constitutional: Negative for fever.   Eyes: Negative for visual disturbance.   Respiratory: Positive for shortness of breath. Negative for chest tightness.    Cardiovascular: Negative for chest pain.   Neurological: Positive for dizziness. Negative for headaches.   All other systems reviewed and are negative.      Objective   Physical Exam  Constitutional:       Appearance: He is well-developed.   HENT:      Head: Normocephalic and atraumatic.   Eyes:      Extraocular Movements: EOM normal.      Conjunctiva/sclera: Conjunctivae normal.      Pupils: Pupils are equal, round, and reactive to light.   Neck:       Vascular: No carotid bruit.   Neurological:      Mental Status: He is oriented to person, place, and time.      Coordination: Finger-Nose-Finger Test and Heel to Shin Test normal.      Gait: Gait is intact.      Deep Tendon Reflexes:      Reflex Scores:       Tricep reflexes are 2+ on the right side and 2+ on the left side.       Bicep reflexes are 2+ on the right side and 2+ on the left side.       Brachioradialis reflexes are 2+ on the right side and 2+ on the left side.       Patellar reflexes are 2+ on the right side and 2+ on the left side.       Achilles reflexes are 2+ on the right side and 2+ on the left side.  Psychiatric:         Speech: Speech normal.       Neurologic Exam     Mental Status   Oriented to person, place, and time.   Registration of memory: Good recent and remote memory.   Attention: normal. Concentration: normal.   Speech: speech is normal   Level of consciousness: alert  Knowledge: consistent with education.     Cranial Nerves     CN II   Visual fields full to confrontation.   Visual acuity: normal    CN III, IV, VI   Pupils are equal, round, and reactive to light.  Extraocular motions are normal.     CN V   Facial sensation intact.   Right corneal reflex: normal  Left corneal reflex: normal    CN VII   Facial expression full, symmetric.   Right facial weakness: none  Left facial weakness: none    CN VIII   Hearing: intact    CN IX, X   Palate: symmetric    CN XI   Right sternocleidomastoid strength: normal  Left sternocleidomastoid strength: normal    CN XII   Tongue: not atrophic  Tongue deviation: none    Motor Exam   Muscle bulk: normal  Right arm tone: normal  Left arm tone: normal  Right leg tone: normal  Left leg tone: normal    Strength   Strength 5/5 except as noted.     Sensory Exam   Light touch normal.     Gait, Coordination, and Reflexes     Gait  Gait: normal    Coordination   Finger to nose coordination: normal  Heel to shin coordination: normal    Reflexes   Right  brachioradialis: 2+  Left brachioradialis: 2+  Right biceps: 2+  Left biceps: 2+  Right triceps: 2+  Left triceps: 2+  Right patellar: 2+  Left patellar: 2+  Right achilles: 2+  Left achilles: 2+  Right : 2+  Left : 2+      Assessment & Plan   Independent Review of Radiographic Studies:      I reviewed the postoperative CT scan done on 8/26/2015 which shows good position of the shunt within the enlarged ventricles.  Agree with the report    Medical Decision Making:      I reprogrammed him from 1.5-1.0.  We will get a CT scan in 3 months just before the next visit.  It can be done in [place].      Diagnoses and all orders for this visit:    1. Normal pressure hydrocephalus (HCC) (Primary)  -     CT Head Without Contrast; Future    2. Encounter for management of cerebrospinal fluid drainage device  -     CT Head Without Contrast; Future      Return in about 3 months (around 8/25/2022) for Face-to-face.

## 2022-05-25 ENCOUNTER — OFFICE VISIT (OUTPATIENT)
Dept: NEUROSURGERY | Facility: CLINIC | Age: 72
End: 2022-05-25

## 2022-05-25 VITALS
DIASTOLIC BLOOD PRESSURE: 68 MMHG | OXYGEN SATURATION: 98 % | WEIGHT: 197 LBS | SYSTOLIC BLOOD PRESSURE: 132 MMHG | BODY MASS INDEX: 27.58 KG/M2 | HEIGHT: 71 IN | HEART RATE: 60 BPM | TEMPERATURE: 97.1 F

## 2022-05-25 DIAGNOSIS — Z45.41 ENCOUNTER FOR MANAGEMENT OF CEREBROSPINAL FLUID DRAINAGE DEVICE: ICD-10-CM

## 2022-05-25 DIAGNOSIS — G91.2 NORMAL PRESSURE HYDROCEPHALUS: Primary | ICD-10-CM

## 2022-05-25 PROCEDURE — 62252 CSF SHUNT REPROGRAM: CPT | Performed by: NEUROLOGICAL SURGERY

## 2022-05-25 PROCEDURE — 99213 OFFICE O/P EST LOW 20 MIN: CPT | Performed by: NEUROLOGICAL SURGERY

## 2022-05-25 RX ORDER — LISINOPRIL 20 MG/1
TABLET ORAL
COMMUNITY
Start: 2022-05-02

## 2022-08-17 ENCOUNTER — HOSPITAL ENCOUNTER (OUTPATIENT)
Dept: CT IMAGING | Facility: HOSPITAL | Age: 72
Discharge: HOME | End: 2022-08-17
Admitting: NEUROLOGICAL SURGERY

## 2022-08-17 DIAGNOSIS — Z45.41 ENCOUNTER FOR MANAGEMENT OF CEREBROSPINAL FLUID DRAINAGE DEVICE: ICD-10-CM

## 2022-08-17 DIAGNOSIS — G91.2 NORMAL PRESSURE HYDROCEPHALUS: ICD-10-CM

## 2022-08-17 PROCEDURE — 70450 CT HEAD/BRAIN W/O DYE: CPT

## 2022-08-25 NOTE — PROGRESS NOTES
Subjective   Patient ID: Sandro De Anda is a 71 y.o. male is here today for follow-up of NPH after head CT. Shunt set at 1.0 at last visit.     Mr. De Anda reports he is doing well and has noticed some gradual improvements. He has not had any dizziness, headaches, lightheadedness, nausea or vomiting. His vision is not disturbed, and he does not have balance problems.     He is with his wife.  He had his right frontal  shunt placed 7 years ago in 2015 for normal pressure hydrocephalus.  We changed the shunt setting to 1.0 from 1.5 last time.  It is confirmed to be at that same setting.  It pumps and refills well.  He thinks his gait is a little bit better and his wife agrees.  We will keep it there.  The CT scan showed no complicating factors such as subdural collections.  We will see him in 9 months.  If he stable, on a yearly basis after that.        History of Present Illness    The following portions of the patient's history were reviewed and updated as appropriate: allergies, current medications, past family history, past medical history, past social history, past surgical history and problem list.    Review of Systems   Constitutional: Negative for fever.   Eyes: Negative for visual disturbance.   Gastrointestinal: Negative for nausea and vomiting.   Musculoskeletal: Negative for gait problem.   Neurological: Negative for dizziness, light-headedness and headaches.   All other systems reviewed and are negative.      Objective   Physical Exam  Constitutional:       Appearance: He is well-developed.   HENT:      Head: Normocephalic and atraumatic.   Eyes:      Extraocular Movements: EOM normal.      Conjunctiva/sclera: Conjunctivae normal.      Pupils: Pupils are equal, round, and reactive to light.   Neck:      Vascular: No carotid bruit.   Neurological:      Mental Status: He is oriented to person, place, and time.      Coordination: Finger-Nose-Finger Test and Heel to Shin Test normal.      Gait: Gait is intact.       Deep Tendon Reflexes:      Reflex Scores:       Tricep reflexes are 2+ on the right side and 2+ on the left side.       Bicep reflexes are 2+ on the right side and 2+ on the left side.       Brachioradialis reflexes are 2+ on the right side and 2+ on the left side.       Patellar reflexes are 2+ on the right side and 2+ on the left side.       Achilles reflexes are 2+ on the right side and 2+ on the left side.  Psychiatric:         Speech: Speech normal.       Neurologic Exam     Mental Status   Oriented to person, place, and time.   Registration of memory: Good recent and remote memory.   Attention: normal. Concentration: normal.   Speech: speech is normal   Level of consciousness: alert  Knowledge: consistent with education.     Cranial Nerves     CN II   Visual fields full to confrontation.   Visual acuity: normal    CN III, IV, VI   Pupils are equal, round, and reactive to light.  Extraocular motions are normal.     CN V   Facial sensation intact.   Right corneal reflex: normal  Left corneal reflex: normal    CN VII   Facial expression full, symmetric.   Right facial weakness: none  Left facial weakness: none    CN VIII   Hearing: intact    CN IX, X   Palate: symmetric    CN XI   Right sternocleidomastoid strength: normal  Left sternocleidomastoid strength: normal    CN XII   Tongue: not atrophic  Tongue deviation: none    Motor Exam   Muscle bulk: normal  Right arm tone: normal  Left arm tone: normal  Right leg tone: normal  Left leg tone: normal    Strength   Strength 5/5 except as noted.     Sensory Exam   Light touch normal.     Gait, Coordination, and Reflexes     Gait  Gait: normal    Coordination   Finger to nose coordination: normal  Heel to shin coordination: normal    Reflexes   Right brachioradialis: 2+  Left brachioradialis: 2+  Right biceps: 2+  Left biceps: 2+  Right triceps: 2+  Left triceps: 2+  Right patellar: 2+  Left patellar: 2+  Right achilles: 2+  Left achilles: 2+  Right :  2+  Left : 2+      Assessment & Plan   Independent Review of Radiographic Studies:      The CT scan of the brain done on 8/17/2022 showed stable position of the ventricular catheter roughly the same configuration of the ventricle as compared to 2015.  Agree with the report.      Medical Decision Making:      Shunt currently set at 1.0.  It is functional.  It pumps and refills well.  He seems to be clinically better.  We will see him in 9 months.      Diagnoses and all orders for this visit:    1. Normal pressure hydrocephalus (HCC) (Primary)    2. Encounter for management of cerebrospinal fluid drainage device      Return in about 9 months (around 5/31/2023) for Face-to-face.

## 2022-08-31 ENCOUNTER — OFFICE VISIT (OUTPATIENT)
Dept: NEUROSURGERY | Facility: CLINIC | Age: 72
End: 2022-08-31

## 2022-08-31 VITALS
WEIGHT: 197.31 LBS | BODY MASS INDEX: 27.62 KG/M2 | HEART RATE: 48 BPM | OXYGEN SATURATION: 97 % | HEIGHT: 71 IN | DIASTOLIC BLOOD PRESSURE: 70 MMHG | SYSTOLIC BLOOD PRESSURE: 120 MMHG | TEMPERATURE: 97.7 F

## 2022-08-31 DIAGNOSIS — G91.2 NORMAL PRESSURE HYDROCEPHALUS: Primary | ICD-10-CM

## 2022-08-31 DIAGNOSIS — Z45.41 ENCOUNTER FOR MANAGEMENT OF CEREBROSPINAL FLUID DRAINAGE DEVICE: ICD-10-CM

## 2022-08-31 PROCEDURE — 99213 OFFICE O/P EST LOW 20 MIN: CPT | Performed by: NEUROLOGICAL SURGERY

## 2023-03-02 ENCOUNTER — OFFICE VISIT (OUTPATIENT)
Dept: UROLOGY | Facility: CLINIC | Age: 73
End: 2023-03-02
Payer: MEDICARE

## 2023-03-02 VITALS — RESPIRATION RATE: 15 BRPM | HEIGHT: 71 IN | WEIGHT: 191.8 LBS | BODY MASS INDEX: 26.85 KG/M2

## 2023-03-02 DIAGNOSIS — N40.0 BENIGN PROSTATIC HYPERPLASIA, UNSPECIFIED WHETHER LOWER URINARY TRACT SYMPTOMS PRESENT: Primary | ICD-10-CM

## 2023-03-02 PROBLEM — I10 HYPERTENSION: Status: ACTIVE | Noted: 2022-03-07

## 2023-03-02 LAB
BILIRUB BLD-MCNC: NEGATIVE MG/DL
CLARITY, POC: CLEAR
COLOR UR: YELLOW
EXPIRATION DATE: ABNORMAL
GLUCOSE UR STRIP-MCNC: NEGATIVE MG/DL
KETONES UR QL: NEGATIVE
LEUKOCYTE EST, POC: NEGATIVE
Lab: ABNORMAL
NITRITE UR-MCNC: NEGATIVE MG/ML
PH UR: 6 [PH] (ref 5–8)
PROT UR STRIP-MCNC: NEGATIVE MG/DL
RBC # UR STRIP: ABNORMAL /UL
SP GR UR: 1.03 (ref 1–1.03)
URINE VOLUME: 0
UROBILINOGEN UR QL: ABNORMAL

## 2023-03-02 PROCEDURE — 99212 OFFICE O/P EST SF 10 MIN: CPT | Performed by: NURSE PRACTITIONER

## 2023-03-02 PROCEDURE — 51798 US URINE CAPACITY MEASURE: CPT | Performed by: NURSE PRACTITIONER

## 2023-03-02 PROCEDURE — 81003 URINALYSIS AUTO W/O SCOPE: CPT | Performed by: NURSE PRACTITIONER

## 2023-03-02 RX ORDER — DIPHENOXYLATE HYDROCHLORIDE AND ATROPINE SULFATE 2.5; .025 MG/1; MG/1
1 TABLET ORAL DAILY
COMMUNITY

## 2023-03-02 RX ORDER — CETIRIZINE HYDROCHLORIDE 10 MG/1
10 TABLET ORAL DAILY
COMMUNITY

## 2023-03-02 NOTE — PROGRESS NOTES
"Chief Complaint: Benign Prostatic Hypertrophy (F/u)    Subjective         History of Present Illness  Sandro De Anda is a 72 y.o. male presents to [facility] to be seen for annual follow-up BPH.    Patient is status post Rezum 5/20.    Good stream most of the time.    Occasional postvoid dribble.    Nocturia x 0-1     No GH     NO UTIs since we last saw him.        Objective     Past Medical History:   Diagnosis Date   • DDD (degenerative disc disease), cervical    • Dementia (HCC)    • NPH (normal pressure hydrocephalus) (HCC)        Past Surgical History:   Procedure Laterality Date   • NO PAST SURGERIES           Current Outpatient Medications:   •  aspirin 81 MG tablet, Take  by mouth., Disp: , Rfl:   •  cetirizine (zyrTEC) 10 MG tablet, Take 1 tablet by mouth Daily., Disp: , Rfl:   •  ibuprofen (ADVIL,MOTRIN) 200 MG tablet, ibuprofen 200 mg oral tablet take 1 tablet (200 mg) by oral route every 6 hours as needed with food   Suspended, Disp: , Rfl:   •  lisinopril (PRINIVIL,ZESTRIL) 20 MG tablet, , Disp: , Rfl:   •  Multiple Vitamin (MULTIVITAMINS PO), Take  by mouth., Disp: , Rfl:   •  multivitamin (THERAGRAN) tablet tablet, Take 1 tablet by mouth Daily., Disp: , Rfl:     No Known Allergies     Family History   Problem Relation Age of Onset   • Stroke Other    • Heart failure Other    • Hypertension Other    • Aneurysm Other        Social History     Socioeconomic History   • Marital status:    • Years of education: high school graduate   Tobacco Use   • Smoking status: Never   • Smokeless tobacco: Never   Vaping Use   • Vaping Use: Never used   Substance and Sexual Activity   • Alcohol use: No   • Drug use: No   • Sexual activity: Defer       Vital Signs:   Resp 15   Ht 180.3 cm (71\")   Wt 87 kg (191 lb 12.8 oz)   BMI 26.75 kg/m²      Physical Exam     Result Review :   The following data was reviewed by: CHACE Martin on 03/02/2023:  Results for orders placed or " performed in visit on 03/02/23   Bladder Scan   Result Value Ref Range    Urine Volume 0    POC Urinalysis Dipstick, Automated    Specimen: Urine   Result Value Ref Range    Color Yellow Yellow, Straw, Dark Yellow, Sahra    Clarity, UA Clear Clear    Specific Gravity  1.030 1.005 - 1.030    pH, Urine 6.0 5.0 - 8.0    Leukocytes Negative Negative    Nitrite, UA Negative Negative    Protein, POC Negative Negative mg/dL    Glucose, UA Negative Negative mg/dL    Ketones, UA Negative Negative    Urobilinogen, UA 0.2 E.U./dL Normal, 0.2 E.U./dL    Bilirubin Negative Negative    Blood, UA Trace (A) Negative    Lot Number [id number]     Expiration Date 4/2,024         Bladder Scan interpretation 03/02/2023    Estimation of residual urine via [email or web address] 3000 10-20 [employer] Bladder Scan  MA/nurse performing: Tomas GARCIA RN   Residual Urine: 0 ml  Indication: Benign prostatic hyperplasia, unspecified whether lower urinary tract symptoms present   Position: Supine  Examination: Incremental scanning of the suprapubic area using 2.0 MHz transducer using copious amounts of acoustic gel.   Findings: An anechoic area was demonstrated which represented the bladder, with measurement of residual urine as noted. I inspected this myself. In that the residual urine was stable or insignificant, refer to plan for treatment and plan necessary at this time.             Procedures        Assessment and Plan    Diagnoses and all orders for this visit:    1. Benign prostatic hyperplasia, unspecified whether lower urinary tract symptoms present (Primary)  -     POC Urinalysis Dipstick, Automated  -     Bladder Scan      Patient is doing well at this point in time and is maintained off of prostate medicine.    Given that he has done so well and is not requiring any urologic intervention at this point we will see him as needed and he will follow-up with his PCP as scheduled.  He will call us if he is with any new or worsening issues to be seen sooner.    I spent 10  minutes caring for Sandro on this date of service. This time includes time spent by me in the following activities:reviewing tests, obtaining and/or reviewing a separately obtained history, performing a medically appropriate examination and/or evaluation , counseling and educating the patient/family/caregiver, ordering medications, tests, or procedures, and documenting information in the medical record  Follow Up   Return if symptoms worsen or fail to improve.  Patient was given instructions and counseling regarding his condition or for health maintenance advice. Please see specific information pulled into the AVS if appropriate.         This document has been electronically signed by CHACE Martin  March 2, 2023 10:25 EST

## 2023-05-30 ENCOUNTER — TELEPHONE (OUTPATIENT)
Dept: NEUROSURGERY | Facility: CLINIC | Age: 73
End: 2023-05-30

## 2023-05-30 NOTE — TELEPHONE ENCOUNTER
LVM: Informed patient that Dr. Galvan will be unable to make the scheduled appt tomorrow on 5/31/23. He has been rescheduled with his APC to 6/12/2023 @10:30AM. [facility] can read/advise.

## 2023-05-31 NOTE — PROGRESS NOTES
"Subjective   Patient ID: Sandro De Anda is a 72 y.o. male is here today for follow-up for shunt check. He had a right frontal programmable Medtronic  shunt on 8/25/15 by Dr. Galvan for NPH.  Current shunt setting 1.0.    History of Present Illness  Today, Mr. De Anda reports no headaches, dizziness, visual problems and balance problems. C/o leg weakness for years. Seems to drag his heels especially in grassy areas. No falls. No leg pain or numbness. They do feel heavy. No incontinence. He had urologic procedures and now off meds. He continues have memory issues but overall stable.     The following portions of the patient's history were reviewed and updated as appropriate: allergies, current medications, past family history, past medical history, past social history, past surgical history, and problem list.    Review of Systems   Eyes:  Negative for visual disturbance.   Genitourinary:  Negative for enuresis.   Musculoskeletal:  Positive for gait problem (Leg fatigue). Negative for neck pain.   Neurological:  Positive for weakness. Negative for numbness and headaches.   Psychiatric/Behavioral:  Positive for confusion.      Objective     Vitals:    06/12/23 1011   BP: 142/80   Pulse: 61   SpO2: 98%   Weight: 86.6 kg (191 lb)   Height: 180.3 cm (71\")     Body mass index is 26.64 kg/m².    Tobacco Use: Low Risk     Smoking Tobacco Use: Never    Smokeless Tobacco Use: Never    Passive Exposure: Not on file          Physical Exam  Vitals reviewed.   Constitutional:       Appearance: Normal appearance.   HENT:      Head:      Comments: Right frontal  shunt site well-healed.  Power pumps and fills easily.  No redness along the catheter tract  Pulmonary:      Effort: Pulmonary effort is normal.   Musculoskeletal:      Cervical back: Neck supple. No tenderness.   Skin:     General: Skin is warm and dry.      Comments: Abdominal incision well-healed.  No abdominal redness   Neurological:      Mental Status: He is " alert and oriented to person, place, and time.      Cranial Nerves: Cranial nerves 2-12 are intact.      Motor: Motor strength is normal.      Coordination: Romberg Test normal.      Gait: Gait is intact. Tandem walk normal.      Deep Tendon Reflexes:      Reflex Scores:       Patellar reflexes are 1+ on the right side and 2+ on the left side.  Psychiatric:         Mood and Affect: Mood normal.         Speech: Speech normal.         Thought Content: Thought content normal.     Neurologic Exam     Mental Status   Oriented to person, place, and time.   Registration: recalls 3 of 3 objects. Recall at 5 minutes: recalls 2 of 3 objects. Follows 3 step commands.   Attention: normal. Concentration: normal.   Speech: speech is normal   Level of consciousness: alert  Knowledge: good.   Able to name object. Able to repeat. Normal comprehension.     Cranial Nerves   Cranial nerves II through XII intact.     Motor Exam   Right arm pronator drift: absent  Left arm pronator drift: absent    Strength   Strength 5/5 throughout.     Gait, Coordination, and Reflexes     Gait  Gait: normal    Coordination   Romberg: negative  Tandem walking coordination: normal    Reflexes   Right patellar: 1+  Left patellar: 2+  Right ankle clonus: absent  Left ankle clonus: absent        Assessment & Plan   Independent Review of Radiographic Studies:      I personally reviewed the images from the following studies.    No new imaging    Medical Decision Making:      Patient presents for follow-up of normal pressure hydrocephalus status post  shunt in 2015.  His last shunt adjustment was in May 2022 when it was taken from 1.5-1.0.  He is overall unchanged since his last visit.  He has some ongoing memory issues which his wife feels that are slightly worsened.  He complains of some weakness in his legs particularly with walking on grass.  His legs feel heavy but he denies any pain or numbness.  He is anticipating initiation of physical therapy  tomorrow ordered by his PCP.  He has good strength in his legs on exam.  His gait is normal and he has a negative Romberg and tandem.   shunt set at 1.0.  Setting checked 3 times.  Alburtis pumps and fills.  He is stable from neurosurgical standpoint with no acute issues.  I did advise him with the physical therapy did not seem to improve his leg strength, he can contact us and we can consider working up for lumbar stenosis.  Otherwise, we will see him back in 1 year for clinical check of his NPH.    Diagnoses and all orders for this visit:    1. Normal pressure hydrocephalus (Primary)    2. S/P Medtronic Strata  shunt 8/25/2015 with Dr. Galvan- set at 1.5    3. Weakness      Return in about 1 year (around 6/12/2024) for Follow-up with APC.

## 2023-06-12 ENCOUNTER — OFFICE VISIT (OUTPATIENT)
Dept: NEUROSURGERY | Facility: CLINIC | Age: 73
End: 2023-06-12
Payer: MEDICARE

## 2023-06-12 VITALS
HEART RATE: 61 BPM | HEIGHT: 71 IN | SYSTOLIC BLOOD PRESSURE: 142 MMHG | BODY MASS INDEX: 26.74 KG/M2 | OXYGEN SATURATION: 98 % | DIASTOLIC BLOOD PRESSURE: 80 MMHG | WEIGHT: 191 LBS

## 2023-06-12 DIAGNOSIS — Z98.2 S/P VP SHUNT: ICD-10-CM

## 2023-06-12 DIAGNOSIS — G91.2 NORMAL PRESSURE HYDROCEPHALUS: Primary | ICD-10-CM

## 2023-06-12 DIAGNOSIS — R53.1 WEAKNESS: ICD-10-CM

## 2023-06-12 PROCEDURE — 99214 OFFICE O/P EST MOD 30 MIN: CPT | Performed by: NURSE PRACTITIONER

## 2023-06-12 PROCEDURE — 1159F MED LIST DOCD IN RCRD: CPT | Performed by: NURSE PRACTITIONER

## 2023-06-12 PROCEDURE — 3079F DIAST BP 80-89 MM HG: CPT | Performed by: NURSE PRACTITIONER

## 2023-06-12 PROCEDURE — 3077F SYST BP >= 140 MM HG: CPT | Performed by: NURSE PRACTITIONER

## 2023-06-12 PROCEDURE — 1160F RVW MEDS BY RX/DR IN RCRD: CPT | Performed by: NURSE PRACTITIONER

## 2024-07-01 NOTE — PROGRESS NOTES
"Subjective   Patient ID: Sandro De Anda is a 73 y.o. male is here today for follow-up for normal pressure hydrocephalus.     History of Present Illness    He returns office today for ongoing follow-up with history of a right frontal programmable Medtronic  shunt that was originally placed by Dr Galvan in August 25, 2015 for normal pressure hydrocephalus.  Current shunt setting is 1.0.    He was last seen in the office in June 2023 and was doing fairly well.  The patient and his wife report that he has had decreased energy over the past couple of months.  The patient reports that he \"has not been sleeping as well.\"  He feels that the right leg is \"heavier.\"  He is also concerned about some \"balance issues.\"  He has had no new imaging.  The sleep situation has improved over the past couple of weeks and he states that he is sleeping through the night without any sleep medication.  He denies any falls.  He does have a right-sided ear impaction that was diagnosed yesterday.  He is in the process of using some medication to try to get the wax to come out.  He does have chronic hearing loss.  He denies any dizziness, headaches or lightheadedness.  His wife denies any personality changes and her .    He presents with his wife.    The following portions of the patient's history were reviewed and updated as appropriate: allergies, current medications, past family history, past medical history, past social history, past surgical history, and problem list.    Review of Systems   Constitutional:  Positive for fatigue.   Eyes:  Negative for visual disturbance.   Gastrointestinal:  Negative for nausea and vomiting.   Musculoskeletal:  Positive for gait problem (balance issues).   Neurological:  Positive for dizziness, weakness and light-headedness. Negative for numbness and headaches.   Psychiatric/Behavioral:  Negative for confusion and decreased concentration.        /90   Pulse 52   Resp 16   Ht 180.3 cm " "(71\")   Wt 85.3 kg (188 lb)   SpO2 96%   BMI 26.22 kg/m²     Follow-up blood pressure reading is 190/100      Objective     Vitals:    07/02/24 1019 07/02/24 1049   BP: 176/88 180/90   Pulse: 52    Resp: 16    SpO2: 96%    Weight: 85.3 kg (188 lb)    Height: 180.3 cm (71\")      Body mass index is 26.22 kg/m².    Tobacco Use: Low Risk  (7/2/2024)    Patient History     Smoking Tobacco Use: Never     Smokeless Tobacco Use: Never     Passive Exposure: Not on file          Physical Exam  Vitals reviewed.   Constitutional:       Appearance: Normal appearance.   HENT:      Head: Atraumatic.   Pulmonary:      Effort: Pulmonary effort is normal.   Musculoskeletal:         General: Normal range of motion.      Comments: Moving all extremities well  Gait appears equal and intact throughout   Skin:     General: Skin is warm and dry.   Neurological:      General: No focal deficit present.      Mental Status: He is alert and oriented to person, place, and time.      GCS: GCS eye subscore is 4. GCS verbal subscore is 5. GCS motor subscore is 6.      Cranial Nerves: No dysarthria.      Sensory: No sensory deficit.      Motor: No weakness or tremor.      Gait: Gait normal.      Comments: Anxious  Gait is stable, fluid and upright, without shuffling  Negative drift, negative tremors bilateral upper extremity  Finger-to-nose intact bilaterally  Able to stand on heels and toes with assistance   Psychiatric:         Mood and Affect: Mood normal.       Neurologic Exam     Mental Status   Oriented to person, place, and time.   Disoriented to person.   Disoriented to place. Disoriented to city and area.   Disoriented to time. Disoriented to year, month and date.   Registration: recalls 3 of 3 objects. Recall at 5 minutes: recalls 2 of 3 objects. Follows 3 step commands.   Attention: normal. Concentration: normal.   Level of consciousness: alert  Knowledge: good.           Assessment & Plan   Independent Review of Radiographic Studies: "      I personally reviewed the images from the following studies.    No new imaging    Medical Decision Making:      He presents office today for ongoing follow-up with history of normal pressure hydrocephalus and  shunt placement.  Exam as noted above, no red flags.  The patient does have concerns about balance, but walking during his appointment today his gait was stable upright in fluid.  There was no shuffling or any other concerns.  He was able to stand on his heels and toes with assistance.  He has no neuro concerns or changes on exam, as documented above.  Most importantly he shows no signs of ataxia and he denies any incontinence.  His strength is equal and intact throughout.  I do not think he needs any follow-up head CT imaging today.  He will talk with his PCP regarding the right-sided ear impaction that they are addressing with medication.  Explained that this can cause problems with balance and equilibrium.  He has chronic hearing loss.    My biggest concern at the fact that the patient's blood pressure is elevated.  Initially it was 170/80, then it raised to 180/90, and now it is 190/100.  The patient states that the longer he sits here, the more anxious he will get.  He states that he has known whitecoat syndrome.  He took his lisinopril 20 mg tablet this morning.  He has a family history of hypertension.  He also is feeling very anxious today, regarding today's office visit.  Instructed the patient and his wife to go home as they have a home blood pressure cuff.  They will take the blood pressure reading at home and if it remains elevated, he will take an additional 10 mg of lisinopril.  If it remains elevated after that they will call his PCP.  I instructed the patient and his wife that if he has any sudden onset neurochanges, headaches, dizziness lightheadedness, weakness, or anything else, they should go to the emergency room.  They expressed understanding.  He is feeling well and denies any  headaches or any other concerns.    Setting was confirmed to be at 1.0 today.     Plan: Check blood pressure reading as soon as they get home, follow-up in 1 year, call with any other questions or concerns    Diagnoses and all orders for this visit:    1. S/P Medtronic Strata  shunt 8/25/2015 with Dr. Galvan- set at 1.0 (Primary)    2. Hypertension, unspecified type      Return in about 1 year (around 7/2/2025).

## 2024-07-02 ENCOUNTER — OFFICE VISIT (OUTPATIENT)
Dept: NEUROSURGERY | Facility: CLINIC | Age: 74
End: 2024-07-02
Payer: MEDICARE

## 2024-07-02 VITALS
HEIGHT: 71 IN | RESPIRATION RATE: 16 BRPM | OXYGEN SATURATION: 96 % | SYSTOLIC BLOOD PRESSURE: 180 MMHG | HEART RATE: 52 BPM | DIASTOLIC BLOOD PRESSURE: 90 MMHG | WEIGHT: 188 LBS | BODY MASS INDEX: 26.32 KG/M2

## 2024-07-02 DIAGNOSIS — I10 HYPERTENSION, UNSPECIFIED TYPE: ICD-10-CM

## 2024-07-02 DIAGNOSIS — Z98.2 S/P VP SHUNT: Primary | ICD-10-CM

## 2025-05-20 ENCOUNTER — TELEPHONE (OUTPATIENT)
Dept: NEUROSURGERY | Facility: CLINIC | Age: 75
End: 2025-05-20
Payer: MEDICARE